# Patient Record
Sex: FEMALE | Race: BLACK OR AFRICAN AMERICAN | HISPANIC OR LATINO | Employment: UNEMPLOYED | URBAN - NONMETROPOLITAN AREA
[De-identification: names, ages, dates, MRNs, and addresses within clinical notes are randomized per-mention and may not be internally consistent; named-entity substitution may affect disease eponyms.]

---

## 2021-11-11 ENCOUNTER — HOSPITAL ENCOUNTER (EMERGENCY)
Facility: HOSPITAL | Age: 36
Discharge: HOME | End: 2021-11-11
Attending: EMERGENCY MEDICINE | Admitting: EMERGENCY MEDICINE
Payer: COMMERCIAL

## 2021-11-11 VITALS
HEART RATE: 98 BPM | BODY MASS INDEX: 30.22 KG/M2 | RESPIRATION RATE: 18 BRPM | HEIGHT: 59 IN | WEIGHT: 149.91 LBS | OXYGEN SATURATION: 97 % | SYSTOLIC BLOOD PRESSURE: 100 MMHG | TEMPERATURE: 98 F | DIASTOLIC BLOOD PRESSURE: 60 MMHG

## 2021-11-11 DIAGNOSIS — M79.2 RADICULAR PAIN IN RIGHT ARM: Primary | ICD-10-CM

## 2021-11-11 PROCEDURE — 99283 EMERGENCY DEPT VISIT LOW MDM: CPT

## 2021-11-11 PROCEDURE — 99284 EMERGENCY DEPT VISIT MOD MDM: CPT | Performed by: EMERGENCY MEDICINE

## 2021-11-11 RX ORDER — GABAPENTIN 100 MG/1
100 CAPSULE ORAL 3 TIMES DAILY
COMMUNITY

## 2021-11-11 RX ORDER — MELOXICAM 15 MG/1
15 TABLET ORAL DAILY
COMMUNITY

## 2021-11-11 RX ORDER — LIDOCAINE 40 MG/G
CREAM TOPICAL AS NEEDED
Qty: 30 G | Refills: 0 | Status: SHIPPED | OUTPATIENT
Start: 2021-11-11

## 2021-11-12 ENCOUNTER — TELEPHONE (OUTPATIENT)
Dept: PHYSICAL THERAPY | Facility: OTHER | Age: 36
End: 2021-11-12

## 2024-11-07 ENCOUNTER — HOSPITAL ENCOUNTER (EMERGENCY)
Facility: HOSPITAL | Age: 39
Discharge: HOME | End: 2024-11-07
Attending: EMERGENCY MEDICINE
Payer: COMMERCIAL

## 2024-11-07 ENCOUNTER — APPOINTMENT (EMERGENCY)
Dept: RADIOLOGY | Facility: HOSPITAL | Age: 39
End: 2024-11-07
Payer: COMMERCIAL

## 2024-11-07 VITALS
HEART RATE: 61 BPM | SYSTOLIC BLOOD PRESSURE: 109 MMHG | OXYGEN SATURATION: 98 % | RESPIRATION RATE: 20 BRPM | BODY MASS INDEX: 32.68 KG/M2 | WEIGHT: 161.82 LBS | DIASTOLIC BLOOD PRESSURE: 56 MMHG | TEMPERATURE: 97.4 F

## 2024-11-07 DIAGNOSIS — M65.931 EXTENSOR TENOSYNOVITIS OF WRIST, RIGHT: ICD-10-CM

## 2024-11-07 DIAGNOSIS — J22 LOWER RESPIRATORY TRACT INFECTION: Primary | ICD-10-CM

## 2024-11-07 DIAGNOSIS — J45.901 ASTHMA EXACERBATION: ICD-10-CM

## 2024-11-07 LAB
ANION GAP SERPL CALCULATED.3IONS-SCNC: 8 MMOL/L (ref 4–13)
BASOPHILS # BLD AUTO: 0.03 THOUSANDS/ÂΜL (ref 0–0.1)
BASOPHILS NFR BLD AUTO: 1 % (ref 0–1)
BUN SERPL-MCNC: 10 MG/DL (ref 5–25)
CALCIUM SERPL-MCNC: 9.6 MG/DL (ref 8.4–10.2)
CHLORIDE SERPL-SCNC: 106 MMOL/L (ref 96–108)
CO2 SERPL-SCNC: 24 MMOL/L (ref 21–32)
CREAT SERPL-MCNC: 0.55 MG/DL (ref 0.6–1.3)
D DIMER PPP FEU-MCNC: <0.27 UG/ML FEU
EOSINOPHIL # BLD AUTO: 0.51 THOUSAND/ÂΜL (ref 0–0.61)
EOSINOPHIL NFR BLD AUTO: 10 % (ref 0–6)
ERYTHROCYTE [DISTWIDTH] IN BLOOD BY AUTOMATED COUNT: 14.6 % (ref 11.6–15.1)
FLUAV RNA RESP QL NAA+PROBE: NEGATIVE
FLUBV RNA RESP QL NAA+PROBE: NEGATIVE
GFR SERPL CREATININE-BSD FRML MDRD: 120 ML/MIN/1.73SQ M
GLUCOSE SERPL-MCNC: 86 MG/DL (ref 65–140)
HCG SERPL QL: NEGATIVE
HCT VFR BLD AUTO: 42.2 % (ref 34.8–46.1)
HGB BLD-MCNC: 13.7 G/DL (ref 11.5–15.4)
IMM GRANULOCYTES # BLD AUTO: 0.01 THOUSAND/UL (ref 0–0.2)
IMM GRANULOCYTES NFR BLD AUTO: 0 % (ref 0–2)
LYMPHOCYTES # BLD AUTO: 1.76 THOUSANDS/ÂΜL (ref 0.6–4.47)
LYMPHOCYTES NFR BLD AUTO: 34 % (ref 14–44)
MCH RBC QN AUTO: 28.9 PG (ref 26.8–34.3)
MCHC RBC AUTO-ENTMCNC: 32.5 G/DL (ref 31.4–37.4)
MCV RBC AUTO: 89 FL (ref 82–98)
MONOCYTES # BLD AUTO: 0.52 THOUSAND/ÂΜL (ref 0.17–1.22)
MONOCYTES NFR BLD AUTO: 10 % (ref 4–12)
NEUTROPHILS # BLD AUTO: 2.38 THOUSANDS/ÂΜL (ref 1.85–7.62)
NEUTS SEG NFR BLD AUTO: 45 % (ref 43–75)
NRBC BLD AUTO-RTO: 0 /100 WBCS
PLATELET # BLD AUTO: 195 THOUSANDS/UL (ref 149–390)
PMV BLD AUTO: 12.3 FL (ref 8.9–12.7)
POTASSIUM SERPL-SCNC: 4 MMOL/L (ref 3.5–5.3)
RBC # BLD AUTO: 4.74 MILLION/UL (ref 3.81–5.12)
RSV RNA RESP QL NAA+PROBE: NEGATIVE
SARS-COV-2 RNA RESP QL NAA+PROBE: NEGATIVE
SODIUM SERPL-SCNC: 138 MMOL/L (ref 135–147)
T4 FREE SERPL-MCNC: 0.75 NG/DL (ref 0.61–1.12)
TSH SERPL DL<=0.05 MIU/L-ACNC: 1.91 UIU/ML (ref 0.45–4.5)
WBC # BLD AUTO: 5.21 THOUSAND/UL (ref 4.31–10.16)

## 2024-11-07 PROCEDURE — 84439 ASSAY OF FREE THYROXINE: CPT | Performed by: EMERGENCY MEDICINE

## 2024-11-07 PROCEDURE — 71046 X-RAY EXAM CHEST 2 VIEWS: CPT

## 2024-11-07 PROCEDURE — 73110 X-RAY EXAM OF WRIST: CPT

## 2024-11-07 PROCEDURE — 80048 BASIC METABOLIC PNL TOTAL CA: CPT | Performed by: EMERGENCY MEDICINE

## 2024-11-07 PROCEDURE — 0241U HB NFCT DS VIR RESP RNA 4 TRGT: CPT | Performed by: EMERGENCY MEDICINE

## 2024-11-07 PROCEDURE — 85025 COMPLETE CBC W/AUTO DIFF WBC: CPT | Performed by: EMERGENCY MEDICINE

## 2024-11-07 PROCEDURE — 36415 COLL VENOUS BLD VENIPUNCTURE: CPT | Performed by: EMERGENCY MEDICINE

## 2024-11-07 PROCEDURE — 84443 ASSAY THYROID STIM HORMONE: CPT | Performed by: EMERGENCY MEDICINE

## 2024-11-07 PROCEDURE — 84703 CHORIONIC GONADOTROPIN ASSAY: CPT | Performed by: EMERGENCY MEDICINE

## 2024-11-07 PROCEDURE — 99284 EMERGENCY DEPT VISIT MOD MDM: CPT | Performed by: EMERGENCY MEDICINE

## 2024-11-07 PROCEDURE — 99283 EMERGENCY DEPT VISIT LOW MDM: CPT

## 2024-11-07 PROCEDURE — 85379 FIBRIN DEGRADATION QUANT: CPT | Performed by: EMERGENCY MEDICINE

## 2024-11-07 RX ORDER — ALBUTEROL SULFATE 90 UG/1
2 INHALANT RESPIRATORY (INHALATION) EVERY 4 HOURS PRN
Qty: 6.7 G | Refills: 1 | Status: SHIPPED | OUTPATIENT
Start: 2024-11-07

## 2024-11-07 RX ORDER — MELOXICAM 15 MG/1
15 TABLET ORAL DAILY
Qty: 30 TABLET | Refills: 1 | Status: SHIPPED | OUTPATIENT
Start: 2024-11-07

## 2024-11-07 RX ORDER — ALBUTEROL SULFATE 90 UG/1
6 INHALANT RESPIRATORY (INHALATION) ONCE
Status: DISCONTINUED | OUTPATIENT
Start: 2024-11-07 | End: 2024-11-07 | Stop reason: HOSPADM

## 2024-11-07 RX ORDER — ALBUTEROL SULFATE 90 UG/1
4 INHALANT RESPIRATORY (INHALATION) ONCE
Status: DISCONTINUED | OUTPATIENT
Start: 2024-11-07 | End: 2024-11-07

## 2024-11-07 RX ORDER — KETOROLAC TROMETHAMINE 30 MG/ML
10 INJECTION, SOLUTION INTRAMUSCULAR; INTRAVENOUS ONCE
Status: DISCONTINUED | OUTPATIENT
Start: 2024-11-07 | End: 2024-11-07 | Stop reason: HOSPADM

## 2024-11-07 RX ORDER — FLUTICASONE PROPIONATE 220 UG/1
1 AEROSOL, METERED RESPIRATORY (INHALATION) 2 TIMES DAILY
Qty: 36 G | Refills: 0 | Status: SHIPPED | OUTPATIENT
Start: 2024-11-07

## 2024-11-07 NOTE — ED PROVIDER NOTES
Time reflects when diagnosis was documented in both MDM as applicable and the Disposition within this note       Time User Action Codes Description Comment    11/7/2024  7:25 PM Ritz, Channing Rikki Add [J22] Lower respiratory tract infection     11/7/2024  7:25 PM Ritz, Channing Rikki Add [M65.931] Extensor tenosynovitis of wrist, right     11/7/2024  7:25 PM Ritz, Channing Rikki Modify [M65.931] Extensor tenosynovitis of wrist, right (right thumb)     11/7/2024  7:25 PM Ritz, Channing Rikki Add [J45.901] Asthma exacerbation           ED Disposition       ED Disposition   Discharge    Condition   Stable    Date/Time   Thu Nov 7, 2024  7:25 PM    Comment   Micaela Lechugao discharge to home/self care.                   Assessment & Plan       Medical Decision Making  Amount and/or Complexity of Data Reviewed  Labs: ordered. Decision-making details documented in ED Course.  Radiology: ordered and independent interpretation performed.    Risk  Prescription drug management.        ED Course as of 11/08/24 0127   Thu Nov 07, 2024   1732 CBC and differential(!)   1757 D-dimer, quantitative   1757 Basic metabolic panel(!)   1817 TSH   1817 FLU/RSV/COVID - if FLU/RSV clinically relevant (2hr TAT)  Workup thus far notable for normal WBC and hemoglobin.  Normal platelets.  Electrolytes normal.  Normal TSH.  Negative D-dimer.  Negative influenza/COVID/RSV testing   1817 Plain film imaging showing no abnormalities in the thorax or wrist.  Most likely diagnoses:  1.  At the wrist, extensor tenosynovitis.  2.  In the chest, viral lower respiratory tract infection.  This could also be can for the pain the patient is experiencing.  At least some of the pain in the chest is related to overuse/repetitive motion given the nature of her occupation. There does not appear to be any breast abnormality per se that accounts for sx.  3. Herpes simplex infection accounting for the lip lesion   1843 hCG, qualitative pregnancy  Negative   1923  Discussed results of workup with the patient and her daughter.  She does have rhonchi and wheezing present on lung exam; patient herself noted that her asthma symptoms were worse than was typical for her.  She would benefit from an inhaled steroid (discussed option of oral steroid to which she was strongly resistant given concern for weight gain); she has used inhaled steroid previously, and this is certainly reasonable to continue now at least in the setting of her asthma exacerbation. Was agreeable ultimately to inhaled steroid.    She had previously been taken meloxicam for the various myalgias/arthralgias that she experiences but states that the prescribing physician may have retired or at least is no long prescribing this medication for her.  I see no obvious contraindications to it, and it is reasonable to restart at least until she is able to speak with her regular doctor. She does see an orthopedic surgeon already related to the pain in the right wrist and has been recommended to have injections that area; discussed that this is reasonable approach and at least warrants further discussion with the orthopedic surgeon as the discomfort in the wrist is significant limiting to her.  She does have a brace that she is able to wear at times although cannot do so while working.    Reviewed this plan for management including outpatient follow-up to which she was agreeable.  All questions were answered to  satisfaction of patient/family prior to discharge. They expressed understanding and agreed to plan.       Medications - No data to display      ED Risk Strat Scores               SBIRT 22yo+      Flowsheet Row Most Recent Value   Initial Alcohol Screen: US AUDIT-C     3b. FEMALE Any Age, or MALE 65+: How often do you have 4 or more drinks on one occassion? 0 Filed at: 11/07/2024 0992   Audit-C Score 0 Filed at: 11/07/2024 1640   COMPA: How many times in the past year have you...    Used an illegal drug or used a  prescription medication for non-medical reasons? Never Filed at: 2024 1645            Wells' Criteria for PE      Flowsheet Row Most Recent Value   Wells' Criteria for PE    Clinical signs and symptoms of DVT 0 Filed at: 2024 180   PE is primary diagnosis or equally likely 0 Filed at: 2024 180   HR >100 0 Filed at: 2024 180   Immobilization at least 3 days or Surgery in the previous 4 weeks 0 Filed at: 2024 180   Previous, objectively diagnosed PE or DVT 0 Filed at: 2024 180   Hemoptysis 0 Filed at: 2024 180   Malignancy with treatment within 6 months or palliative 0 Filed at: 2024 180   Wells' Criteria Total 0 Filed at: 2024 180            History of Present Illness       Chief Complaint   Patient presents with    Medical Problem     Right breast pain radiating to back for 1 week. Denies injury       Past Medical History:   Diagnosis Date    Asthma       Past Surgical History:   Procedure Laterality Date     SECTION      CYST REMOVAL        History reviewed. No pertinent family history.   Social History     Tobacco Use    Smoking status: Never    Smokeless tobacco: Never   Vaping Use    Vaping status: Never Used   Substance Use Topics    Alcohol use: Never    Drug use: Never      E-Cigarette/Vaping    E-Cigarette Use Never User       E-Cigarette/Vaping Substances      I have reviewed and agree with the history as documented.     39-year-old woman with noted past medical history presents to the ED with several complaints:    1.  Sharp/stabbing pain in the right upper breast/chest wall in the right periscapular region for approximately the past week, with onset at rest and pain worsened with position change/movement of the right shoulder as well as palpation of the right breast.  No preceding trauma or injury. No overlying skin changes/swelling/inflammation.  Has had this previously per her recall and has undergone prior studies including mammogram  6/7 months prior that was normal.  Underwent breast reduction surgery about 3 years ago, and per medical record, is scheduled for breast reduction procedure in January 2025.    DDx includes but is not limited to: Pneumothorax, pneumonia, pleurisy, occult rib fracture.  Exam of breast does not show any external abnormalities and recent normal mammogram argues against breast abnormality as cause of sx.  No overlying skin changes to suggest infection and exam not suggestive of abscess or deep tissue infection more generally.  Check D-dimer as she is low risk for PE by Wells score.  Chest x-ray.  COVID/flu/RSV testing.    2.  Nonproductive cough with some nasal congestion for the past 2 days.  No fevers or chills.  No pleuritic pain.  No hemoptysis.  Has not taken or used anything for symptoms.  No identifiable sick contacts within the past week. No hx VTE. No travel/surgery/immobilization in past week. No LE edema; no calf swelling. Has b/l chronic knee pain that is unchanged now but specifically no calf/thigh swelling.     This could be accounting for some of the chest pain she is experiencing as well.  She will be undergoing a chest x-ray ready for evaluation of this as well as COVID/flu/RSV testing.    3.  Pain in right wrist at the first MCP joint that is longstanding (at least several years) worsened with movements of the wrist and palpation of the area.  She is right-handed.  No prior fracture or surgery in the right hand/wrist.  Does take meloxicam given history of fibromyalgia. She does work in a salon providing manicure/pedicure and notes repetitive motion at the wrist and shoulder; she notes this as a possible trigger of her symptoms and also that worsen symptoms.  She notes this is possible trigger of symptoms and also that it worsen symptoms.    Right wrist x-ray for any bony abnormality.  She noted that she has been told that there is arthritis in the joint.  Exam more suggestive of an extensor  tenosynovitis.  No overlying skin changes suggesting infection.  Likely repetitive use type injury.    4. Rash inferior of lower lip on the right side about 3d prior. Painful/vesicular rash. Has not spread anywhere else. No rash anywhere else on the body.  Did perform waxing on a friend of hers 3d prior to onset of rash. Not sure if she got any wax on her face. Her friend did have a similar rash apparently.     More likely to be herpes simplex infection as opposed to burn as patient does not recall any the hot wax getting into the area in question.  Vesicles are at least partially crusted suggesting beginning of resolution.  Can continue conservative management.      History provided by:  Patient and medical records   used: No (Declined use of )    Medical Problem  Associated symptoms: chest pain, cough, myalgias and rash    Associated symptoms: no abdominal pain, no fatigue, no fever, no nausea, no shortness of breath and no vomiting        Review of Systems   Constitutional:  Negative for chills, fatigue and fever.   Respiratory:  Positive for cough and chest tightness. Negative for shortness of breath.    Cardiovascular:  Positive for chest pain. Negative for palpitations and leg swelling.   Gastrointestinal:  Negative for abdominal pain, nausea and vomiting.   Musculoskeletal:  Positive for arthralgias, joint swelling and myalgias. Negative for neck pain and neck stiffness.   Skin:  Positive for rash.   Neurological:  Negative for syncope, speech difficulty, weakness, light-headedness and numbness.   Hematological:  Negative for adenopathy. Does not bruise/bleed easily.           Objective       ED Triage Vitals   Temperature Pulse Blood Pressure Respirations SpO2 Patient Position - Orthostatic VS   11/07/24 1641 11/07/24 1641 11/07/24 1641 11/07/24 1641 11/07/24 1641 11/07/24 1641   (!) 97.4 °F (36.3 °C) 72 101/62 20 98 % Sitting      Temp Source Heart Rate Source BP Location FiO2  (%) Pain Score    11/07/24 1641 11/07/24 1952 11/07/24 1641 -- 11/07/24 1641    Temporal Monitor Left arm  10 - Worst Possible Pain      Vitals      Date and Time Temp Pulse SpO2 Resp BP Pain Score FACES Pain Rating User   11/07/24 1952 -- 61 98 % -- 109/56 -- -- LD   11/07/24 1641 97.4 °F (36.3 °C) 72 98 % 20 101/62 10 - Worst Possible Pain -- KTR            Physical Exam  Vitals and nursing note reviewed.   Constitutional:       General: She is awake. She is not in acute distress.     Appearance: Normal appearance. She is well-developed.   HENT:      Head: Normocephalic and atraumatic.      Right Ear: Hearing and external ear normal.      Left Ear: Hearing and external ear normal.      Mouth/Throat:        Comments: Vesicular rash inferior of the lower lip and lying to the right side of the indicated area.  Partially crusted.  Patient has applied a topical ointment to the area which partially obscures the detail.   Neck:      Trachea: Trachea and phonation normal.   Cardiovascular:      Rate and Rhythm: Normal rate and regular rhythm.      Pulses:           Radial pulses are 2+ on the right side and 2+ on the left side.        Dorsalis pedis pulses are 2+ on the right side and 2+ on the left side.        Posterior tibial pulses are 2+ on the right side and 2+ on the left side.      Heart sounds: Normal heart sounds, S1 normal and S2 normal. No murmur heard.     No friction rub. No gallop.   Pulmonary:      Effort: Pulmonary effort is normal. No respiratory distress.      Breath sounds: No stridor. Examination of the right-upper field reveals wheezing and rhonchi. Examination of the right-middle field reveals wheezing and rhonchi. Examination of the left-middle field reveals wheezing. Wheezing and rhonchi present. No decreased breath sounds or rales.   Chest:      Chest wall: Tenderness present. No mass, deformity, swelling or edema.   Breasts:     Right: Tenderness present. No swelling, bleeding, inverted nipple,  mass, nipple discharge or skin change.      Left: Normal.          Comments: Multiple areas of tenderness of the upper breast and lateral right chest wall as indicated.  Currently normal visual appearance with no areas of erythema/swelling and no palpable masses/induration.  No nipple inversion.  No changes in texture/color of the skin of the right breast compared to the left.  No palpable lymphadenopathy in the right axillary region nor the chest wall.  Exam assisted by WILBERT Clinton  Abdominal:      General: There is no distension.      Palpations: There is no mass.      Tenderness: There is no abdominal tenderness. There is no guarding or rebound.   Musculoskeletal:      Right shoulder: Tenderness present. No swelling. Normal range of motion.      Left shoulder: Normal.      Right forearm: Normal.      Left forearm: Normal.      Right wrist: Tenderness present. No swelling, bony tenderness or snuff box tenderness. Normal range of motion.      Left wrist: Normal.      Left hand: Normal.        Back:       Comments: Tenderness along the right medial scapular border and inferior most portion of the scapula as indicated with no overlying skin changes.    Significant tenderness of the first MCP joint over the radial aspect of the joint exacerbated by ulnar deviation of the wrist   Lymphadenopathy:      Upper Body:      Right upper body: No supraclavicular, axillary or pectoral adenopathy.      Left upper body: No supraclavicular, axillary or pectoral adenopathy.   Skin:     General: Skin is warm and dry.   Neurological:      Mental Status: She is alert and oriented to person, place, and time.      GCS: GCS eye subscore is 4. GCS verbal subscore is 5. GCS motor subscore is 6.      Cranial Nerves: No cranial nerve deficit.      Sensory: No sensory deficit.      Motor: No abnormal muscle tone.      Comments: PERRLA; EOMI. Sensation intact to light touch over face in V1-V3 distribution bilaterally. Facial expressions  "symmetric. Tongue/uvula midline. Shoulder shrug equal bilaterally. Strength 5/5 in UE/LE bilaterally. Sensation intact to light touch in UE/LE bilaterally.         Results Reviewed       Procedure Component Value Units Date/Time    T4, free [[id number]]  (Normal) Collected: 11/07/24 1721    Lab Status: Final result Specimen: Blood from Arm, Left Updated: 11/07/24 2349     Free T4 0.75 ng/dL     Narrative:        \"Therapeutic range for patients medicated with thyroid disorders: 0.61-1.24 ng/dL.\"    hCG, qualitative pregnancy [[id number]]  (Normal) Collected: 11/07/24 1722    Lab Status: Final result Specimen: Blood from Arm, Left Updated: 11/07/24 1840     Preg, Serum Negative    FLU/RSV/COVID - if FLU/RSV clinically relevant (2hr TAT) [[id number]]  (Normal) Collected: 11/07/24 1722    Lab Status: Final result Specimen: Nares from Nose Updated: 11/07/24 1807     SARS-CoV-2 Negative     INFLUENZA A PCR Negative     INFLUENZA B PCR Negative     RSV PCR Negative    Narrative:      This test has been performed using the CoV-2/Flu/RSV plus assay on the [employer] GeneXpert platform. This test has been validated by the  and verified by the performing laboratory.     This test is designed to amplify and detect the following: nucleocapsid (N), envelope (E), and RNA-dependent RNA polymerase (RdRP) genes of the SARS-CoV-2 genome; matrix (M), basic polymerase (PB2), and acidic protein (PA) segments of the influenza A genome; matrix (M) and non-structural protein (NS) segments of the influenza B genome, and the nucleocapsid genes of RSV A and RSV B.     Positive results are indicative of the presence of Flu A, Flu B, RSV, and/or SARS-CoV-2 RNA. Positive results for SARS-CoV-2 or suspected novel influenza should be reported to state, local, or federal health departments according to local reporting requirements.      All results should be assessed in conjunction with clinical presentation and other laboratory markers for " clinical management.     FOR PEDIATRIC PATIENTS - copy/paste COVID Guidelines URL to browser: [email or web address]       TSH [[id number]]  (Normal) Collected: 11/07/24 1721    Lab Status: Final result Specimen: Blood from Arm, Left Updated: 11/07/24 1805     TSH 3RD GENERATON 1.912 uIU/mL     D-dimer, quantitative [[id number]]  (Normal) Collected: 11/07/24 1721    Lab Status: Final result Specimen: Blood from Arm, Left Updated: 11/07/24 1756     D-Dimer, Quant <0.27 ug/ml FEU     Basic metabolic panel [[id number]]  (Abnormal) Collected: 11/07/24 1722    Lab Status: Final result Specimen: Blood from Arm, Left Updated: 11/07/24 1751     Sodium 138 mmol/L      Potassium 4.0 mmol/L      Chloride 106 mmol/L      CO2 24 mmol/L      ANION GAP 8 mmol/L      BUN 10 mg/dL      Creatinine 0.55 mg/dL      Glucose 86 mg/dL      Calcium 9.6 mg/dL      eGFR 120 ml/min/1.73sq m     Narrative:      National Kid[employer] guidelines for Chronic Kidney Disease (CKD):     Stage 1 with normal or high GFR (GFR > 90 mL/min/1.73 square meters)    Stage 2 Mild CKD (GFR = 60-89 mL/min/1.73 square meters)    Stage 3A Moderate CKD (GFR = 45-59 mL/min/1.73 square meters)    Stage 3B Moderate CKD (GFR = 30-44 mL/min/1.73 square meters)    Stage 4 Severe CKD (GFR = 15-29 mL/min/1.73 square meters)    Stage 5 End Stage CKD (GFR <15 mL/min/1.73 square meters)  Note: GFR calculation is accurate only with a steady state creatinine    CBC and differential [[id number]]  (Abnormal) Collected: 11/07/24 1721    Lab Status: Final result Specimen: Blood from Arm, Left Updated: 11/07/24 1730     WBC 5.21 Thousand/uL      RBC 4.74 Million/uL      Hemoglobin 13.7 g/dL      Hematocrit 42.2 %      MCV 89 fL      MCH 28.9 pg      MCHC 32.5 g/dL      RDW 14.6 %      MPV 12.3 fL      Platelets 195 Thousands/uL      nRBC 0 /100 WBCs      Segmented % 45 %      Immature Grans % 0 %      Lymphocytes % 34 %       Monocytes % 10 %      Eosinophils Relative 10 %      Basophils Relative 1 %      Absolute Neutrophils 2.38 Thousands/µL      Absolute Immature Grans 0.01 Thousand/uL      Absolute Lymphocytes 1.76 Thousands/µL      Absolute Monocytes 0.52 Thousand/µL      Eosinophils Absolute 0.51 Thousand/µL      Basophils Absolute 0.03 Thousands/µL             XR wrist 3+ views RIGHT   ED Interpretation by Channing Foley DO (11/07 1744)   No fracture or dislocation.  Joint spaces appear normal  No significant degenerative disease      XR chest 2 views   ED Interpretation by Channing Foley DO (11/07 1743)   Airway is midline. Lungs are clear bilaterally with no evidence of pulmonary vascular congestion/focal infiltrate/pleural effusion/pneumothorax. Cardiac and mediastinal silhouettes are within normal limits. Osseous structures appear normal.            Procedures    ED Medication and Procedure Management   Prior to Admission Medications   Prescriptions Last Dose Informant Patient Reported? Taking?   gabapentin (NEURONTIN) 100 mg capsule   Yes No   Sig: Take 100 mg by mouth 3 (three) times a day   lidocaine (LMX) 4 % cream   No No   Sig: Apply topically as needed for mild pain   meloxicam (MOBIC) 15 mg tablet   Yes No   Sig: Take 15 mg by mouth daily   meloxicam (MOBIC) 15 mg tablet   No Yes   Sig: Take 1 tablet (15 mg total) by mouth daily      Facility-Administered Medications: None     Discharge Medication List as of 11/7/2024  7:44 PM        START taking these medications    Details   albuterol (ProAir HFA) 90 mcg/act inhaler Inhale 2 puffs every 4 (four) hours as needed for wheezing, Starting u 11/7/2024, Normal      fluticasone (Flovent HFA) 220 mcg/act inhaler Inhale 1 puff 2 (two) times a day Rinse mouth after use., Starting u 11/7/2024, Normal           CONTINUE these medications which have CHANGED    Details   meloxicam (MOBIC) 15 mg tablet Take 1 tablet (15 mg total) by mouth daily, Starting Thu 11/7/2024,  Normal           CONTINUE these medications which have NOT CHANGED    Details   gabapentin (NEURONTIN) 100 mg capsule Take 100 mg by mouth 3 (three) times a day, Historical Med      lidocaine (LMX) 4 % cream Apply topically as needed for mild pain, Starting Thu 11/11/2021, Normal           No discharge procedures on file.  ED SEPSIS DOCUMENTATION   Time reflects when diagnosis was documented in both MDM as applicable and the Disposition within this note       Time User Action Codes Description Comment    11/7/2024  7:25 PM Channing Foley [J22] Lower respiratory tract infection     11/7/2024  7:25 PM Channing Foley Add [M65.931] Extensor tenosynovitis of wrist, right     11/7/2024  7:25 PM Channing Foley Modify [M65.931] Extensor tenosynovitis of wrist, right (right thumb)     11/7/2024  7:25 PM Channing Foley Add [J45.901] Asthma exacerbation                  Channing Foley DO  11/09/24 1249

## 2024-11-08 NOTE — DISCHARGE INSTRUCTIONS
Utilice el esteroide inhalado bernadette al menos la próxima semana.  Asista a la próxima adalberto que tenga con akbar médico ortopédico para el tratamiento de akbar remy.  Continúe con otros medicamentos con las dosis y frecuencias actuales.  Le melgar recetado meloxicam para el tratamiento del dolor/inflamación. Úselo de acuerdo con las instrucciones.